# Patient Record
Sex: MALE | Race: OTHER | Employment: OTHER | URBAN - METROPOLITAN AREA
[De-identification: names, ages, dates, MRNs, and addresses within clinical notes are randomized per-mention and may not be internally consistent; named-entity substitution may affect disease eponyms.]

---

## 2022-09-26 ENCOUNTER — APPOINTMENT (OUTPATIENT)
Dept: GENERAL RADIOLOGY | Age: 42
End: 2022-09-26

## 2022-09-26 ENCOUNTER — HOSPITAL ENCOUNTER (EMERGENCY)
Age: 42
Discharge: HOME | End: 2022-09-26
Attending: EMERGENCY MEDICINE

## 2022-09-26 VITALS
RESPIRATION RATE: 16 BRPM | HEART RATE: 71 BPM | OXYGEN SATURATION: 100 % | SYSTOLIC BLOOD PRESSURE: 130 MMHG | TEMPERATURE: 98.6 F | HEIGHT: 67 IN | DIASTOLIC BLOOD PRESSURE: 86 MMHG

## 2022-09-26 DIAGNOSIS — M25.561 RIGHT KNEE PAIN, UNSPECIFIED CHRONICITY: Primary | ICD-10-CM

## 2022-09-26 PROCEDURE — 73562 X-RAY EXAM OF KNEE 3: CPT

## 2022-09-26 PROCEDURE — 99283 EMERGENCY DEPT VISIT LOW MDM: CPT

## 2022-09-26 RX ORDER — IBUPROFEN 800 MG/1
800 TABLET ORAL
Status: DISCONTINUED | OUTPATIENT
Start: 2022-09-26 | End: 2022-09-26 | Stop reason: HOSPADM

## 2022-09-26 ASSESSMENT — ENCOUNTER SYMPTOMS
BACK PAIN: 0
COLOR CHANGE: 0
SHORTNESS OF BREATH: 0
VOMITING: 0
NAUSEA: 0
COUGH: 0

## 2022-09-26 ASSESSMENT — PAIN DESCRIPTION - ORIENTATION: ORIENTATION: RIGHT

## 2022-09-26 ASSESSMENT — PAIN - FUNCTIONAL ASSESSMENT
PAIN_FUNCTIONAL_ASSESSMENT: 0-10
PAIN_FUNCTIONAL_ASSESSMENT: 0-10

## 2022-09-26 ASSESSMENT — PAIN SCALES - GENERAL
PAINLEVEL_OUTOF10: 7
PAINLEVEL_OUTOF10: 7

## 2022-09-26 ASSESSMENT — PAIN DESCRIPTION - LOCATION: LOCATION: KNEE

## 2022-09-26 ASSESSMENT — PAIN DESCRIPTION - DESCRIPTORS: DESCRIPTORS: SHARP;SHOOTING

## 2022-09-26 NOTE — DISCHARGE INSTRUCTIONS
Rest, ice, elevate, compression bandage as directed. Take NSAIDs for pain relief as directed. Schedule close follow-up with [facility]. Return if symptoms worsen or progress in any way.

## 2022-09-26 NOTE — ED NOTES
I have reviewed discharge instructions with the patient. The patient verbalized understanding. Patient left ED via Discharge Method: ambulatory to Home with spouse    Opportunity for questions and clarification provided. Patient given 0 scripts. To continue your aftercare when you leave the hospital, you may receive an automated call from our care team to check in on how you are doing. This is a free service and part of our promise to provide the best care and service to meet your aftercare needs.  If you have questions, or wish to unsubscribe from this service please call [phone]. Thank you for Choosing our [facility] Emergency Department.       Camille Childers RN  09/26/22 1442

## 2022-09-26 NOTE — ED NOTES
A few weeks ago pt fell off of a trailer and felt like his right knee \"rolled\"  \"buckled\". Has been having right knee pain ever since, reports trying knee brace and OTC creams with no relief.  Reports 2 days ago he felt like he had to rock it back into place       Lucretia Cost, [address] [facility]  09/26/22 6156

## 2022-09-26 NOTE — ED PROVIDER NOTES
Emergency Department Provider Note                   PCP:                None Provider               Age: 39 y.o. Sex: male       ICD-10-CM    1. Right knee pain, unspecified chronicity  M25.561           DISPOSITION Decision To Discharge 09/26/2022 12:12:02 PM        MDM  Number of Diagnoses or Management Options  Right knee pain, unspecified chronicity: new, needed workup  Diagnosis management comments: Vital signs stable. Afebrile. X-ray of right knee with no acute radiographic abnormality present. Patient given Motrin. Will place knee immobilizer on right knee and have patient follow-up with [facility] orth[facility]. Patient instructed take NSAIDs as directed. Patient given strict return precautions. Amount and/or Complexity of Data Reviewed  Tests in the radiology section of CPT®: ordered and reviewed  Tests in the medicine section of CPT®: ordered and reviewed  Review and summarize past medical records: yes  Independent visualization of images, tracings, or specimens: yes    Risk of Complications, Morbidity, and/or Mortality  Presenting problems: low  Diagnostic procedures: low  Management options: low    Patient Progress  Patient progress: stable       ED Course as of 09/26/22 1652   Mon Sep 26, 2022   1205 XR R knee FINDINGS:  No acute fracture or dislocation. Normal alignment is maintained. Joint spaces  are preserved. The soft tissues are within normal limits. IMPRESSION:  No acute radiographic abnormality. [DF]      ED Course User Index  [DF] Naseem Carranza MD        Orders Placed This Encounter   Procedures    XR KNEE RIGHT (3 VIEWS)    [facility] Knee Immobilizer, Right; 20\"        Medications - No data to display    Discharge Medication List as of 9/26/2022 12:22 PM           Chase Woods is a 39 y.o. male who presents to the Emergency Department with chief complaint of  R knee pain.     Chief Complaint   Patient presents with    Knee Pain 49-year-old male with no pertinent past medical history presents with complaint of worsening right knee pain that occurred after patient reports that he fell off of a trailer around 3 weeks ago. States that he was getting off a trailer and felt that his right knee buckled. Denies landing on knee. Denies numbness, tingling, weakness. Denies lower back pain, bowel or bladder incontinence, fever, chills, chest pain, shortness of breath. Patient states that symptoms have worsened over the past several days. States that he has been keeping his right knee in a knee brace and using over-the-counter creams with no relief. States that several days ago he felt that his patella was \"out of place\" and that he had to slowly \"rock it back into place\". Denies any previous knee surgery or injury. Rates symptoms as mild to moderate, cramping in nature. Denies radiation of pain. Denies any redness or warmth to site. States that pain is exacerbated with movement. States the pain is localized to lateral aspect of right knee. Denies hitting head during fall 3 weeks ago. Denies neck pain, back pain. The history is provided by the patient. No  was used. Knee Problem  Location:  Knee  Time since incident:  3 weeks  Injury: yes    Knee location:  R knee  Pain details:     Quality:  Throbbing and cramping    Radiates to:  Does not radiate    Severity:  Mild    Onset quality:  Gradual    Duration:  3 weeks    Timing:  Intermittent    Progression:  Waxing and waning  Chronicity:  New  Relieved by:  Immobilization  Worsened by: Activity and bearing weight  Ineffective treatments:  Ice, elevation, compression and immobilization  Associated symptoms: swelling    Associated symptoms: no back pain, no fatigue, no fever, no itching, no muscle weakness, no neck pain, no numbness, no stiffness and no tingling        Review of Systems   Constitutional:  Negative for fatigue and fever.    Respiratory:  Negative for cough and shortness of breath. Cardiovascular:  Negative for chest pain and leg swelling. Gastrointestinal:  Negative for nausea and vomiting. Musculoskeletal:  Positive for arthralgias and joint swelling. Negative for back pain, gait problem, neck pain and stiffness. Skin:  Negative for color change, itching and rash. Neurological:  Negative for dizziness, weakness, numbness and headaches. Hematological:  Does not bruise/bleed easily. No past medical history on file. Denies any significant pertinent past medical history. No past surgical history on file. Denies any significant past surgical history. Family History   Problem Relation Age of Onset    Lupus Mother         Social History     Socioeconomic History    Marital status:    Tobacco Use    Smoking status: Never    Smokeless tobacco: Never   Substance and Sexual Activity    Alcohol use: Yes   Patient states that he smokes marijuana. Patient has no known allergies. Discharge Medication List as of 9/26/2022 12:22 PM        CONTINUE these medications which have NOT CHANGED    Details   diclofenac (VOLTAREN) 50 MG EC tablet Take 50 mg by mouth 2 times daily as neededHistorical Med              Vitals signs and nursing note reviewed. No data found. Physical Exam  Vitals and nursing note reviewed. Constitutional:       Appearance: Normal appearance. HENT:      Head: Normocephalic and atraumatic. Nose: Nose normal.      Mouth/Throat:      Mouth: Mucous membranes are moist.   Eyes:      Extraocular Movements: Extraocular movements intact. Pupils: Pupils are equal, round, and reactive to light. Cardiovascular:      Rate and Rhythm: Normal rate. Pulses: Normal pulses. Heart sounds: Normal heart sounds. Pulmonary:      Effort: Pulmonary effort is normal.      Breath sounds: Normal breath sounds. Abdominal:      Palpations: Abdomen is soft. Tenderness:  There is no abdominal the making of this note. This software is not perfect and grammatical and other typographical errors may be present. This note has not been completely proofread for errors.      Pavan Glover MD  09/26/22 6997